# Patient Record
(demographics unavailable — no encounter records)

---

## 2024-10-31 NOTE — ASSESSMENT
[FreeTextEntry1] : HEALTH CARE MAINTENANCE - Influenza vaccine: Deferred - Covid vaccine: Vaccinated x1 - HIV: Deferred - HEP C: Deferred - HBV: Deferred - TDAP: Deferred - Pap smear: UTD

## 2024-10-31 NOTE — HISTORY OF PRESENT ILLNESS
[FreeTextEntry1] : CPE [de-identified] : This is a 29F with PMHx of L-sided sciatica who is here for CPE. Feels well overall. She complains today of lower back pain radiating to the foot that was initially diagnosed as plantar fasciitis (however has only had imaging of her foot) but she says was found to have sciatica reportedly. Denies any history of back imaging. 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 Reports stretching helps. She also admits to muscle pains/aches both in UE and LE that occur randomly and not related to back movement. Has not tried any OTC medications. Requesting neuro referral. Deferring blood work today because she gets queasy with needles.  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 Social etoh use, vaping tobacco. No drug use.  Sexually active/Last menstrual period: Not sexually active. Depo injection (reports help with her mood swings). Doesnt get periods. [facility] Pap smear UTD in 5/2024.

## 2024-10-31 NOTE — HEALTH RISK ASSESSMENT
[Very Good] : ~his/her~  mood as very good [Yes] : Yes [Monthly or less (1 pt)] : Monthly or less (1 point) [1 or 2 (0 pts)] : 1 or 2 (0 points) [Never (0 pts)] : Never (0 points) [No falls in past year] : Patient reported no falls in the past year [0] : 2) Feeling down, depressed, or hopeless: Not at all (0) [PHQ-2 Negative - No further assessment needed] : PHQ-2 Negative - No further assessment needed [Never] : Never [[id number]] : 0 [Patient reported PAP Smear was normal] : Patient reported PAP Smear was normal [HIV Test offered] : HIV Test offered [Hepatitis C test offered] : Hepatitis C test offered [None] : None [Alone] : lives alone [Single] : single [Sexually Active] : not sexually active [Feels Safe at Home] : Feels safe at home [Fully functional (bathing, dressing, toileting, transferring, walking, feeding)] : Fully functional (bathing, dressing, toileting, transferring, walking, feeding) [Fully functional (using the telephone, shopping, preparing meals, housekeeping, doing laundry, using] : Fully functional and needs no help or supervision to perform IADLs (using the telephone, shopping, preparing meals, housekeeping, doing laundry, using transportation, managing medications and managing finances) [Reports changes in hearing] : Reports no changes in hearing [Reports changes in vision] : Reports no changes in vision [Reports changes in dental health] : Reports no changes in dental health [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

## 2025-01-07 NOTE — HISTORY OF PRESENT ILLNESS
[FreeTextEntry1] : follow up [de-identified] : This is a 29F with PMHx of L-sided sciatica who is here for f/u.

## 2025-01-21 NOTE — HISTORY OF PRESENT ILLNESS
[Home] : at home, [unfilled] , at the time of the visit. [Medical Office: ([facility])___] : at the medical office located in  [Verbal consent obtained from patient] : the patient, [unfilled] [FreeTextEntry8] : This is a 30F with no significant PMHx who is here to request behavioral health assistance in finding therapy, particularly esketamine. She has never tried antidepressants before. 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 She has tried ketamine recreationally in the past and is interested in trialing it for her depressive symptoms.  Discussed with patient: You have chosen to receive care through the use of tele-media. Tele-media enables health care providers at different locations to provide safe, effective and convenient care through the use of technology. Please note that this is a billable encounter. As with any health care service, there are risks associated with the use of tele-media, including equipment failure, poor image and/or resolution, and  issues. You understand that I cannot physically examine you and that you may need to come to the clinic to complete the assessment. Patient agreed verbally to understanding the risks and benefits of tele-media as explained. All questions regarding tele-media encounters were answered. Total time spent: [10]

## 2025-02-19 NOTE — ASSESSMENT
[FreeTextEntry1] : 30-year-old woman presenting with episodic thoracic squeezing sensation, truncal rigidity, paraspinal spasms and stiffness, left lumbosacral radicular pain and more recently right upper extremity pain at the lateral epicondyle and tingling at the fingertips predominantly of the first and second digit.  Differential diagnosis includes spondylotic cervical and lumbosacral radicular pain with myofascial pain; however, given the severity of her paraspinal muscle rigidity on examination, would like to send for GAD65 antibodies, and obtain an EMG nerve conduction study to rule out stiff person syndrome, and screen for rheumatologic markers.  Plan: Screening of rheumatologic markers, check adan 65 antibodies MRI cervical thoracic and lumbar spine with and without gadolinium. **Reason justifying medical necessity for authorization: Cervical and lumbosacral radicular pain, and thoracic squeezing sensation, rule out compressive or noncompressive myelopathy and radiculitis (i.e. nerve root enhancement)** EMG nerve conduction study to rule out right carpal tunnel syndrome, and stiff person syndrome given the thoracic rigidity and spasms Referral to PT, OT, physiatry Follow-up after MRIs and EMG

## 2025-02-19 NOTE — PHYSICAL EXAM
[FreeTextEntry1] :   Vitals: unrevealing   Exam:   AO3. Normally conversant. full affect. Follows commands, names, and repeats. Good attention.   PERRL, VFF, EOMI, no nystagmus, face symmetric, TUP at midline.   Motor:  Rigidity of the paraspinal muscles with prominent lumbar lordosis        R:    L: Del      5    5 Bi      5    5 Tri      5    5 Wrist Extensors   5    5 Finger abductors   5    5      5    5   HF      5    5 KE      5    5 KF      5    5 DF      5    5 PF      5    5   Tone     R    L UE      0    0 LE      0    0   Sensory    RUE   LUE   RLE  LLE LT      +    +   +   + Vib      +    +   +   + JPS      +    +   +   + PP      +    +   +   + Temp     +    +   +   +   Reflexes:       R    L   Biceps   2 2 BR    2 2 Pat     2 2 AJ     2 2   TOES     F    F   Coordination:       R    L  FTN     0    0 GABRIELA     0    0 HTS     0    0   Other           Gait: normal, can heel, toe, tandem